# Patient Record
Sex: FEMALE | URBAN - METROPOLITAN AREA
[De-identification: names, ages, dates, MRNs, and addresses within clinical notes are randomized per-mention and may not be internally consistent; named-entity substitution may affect disease eponyms.]

---

## 2022-07-12 ENCOUNTER — OFFICE VISIT (OUTPATIENT)
Dept: URBAN - METROPOLITAN AREA CLINIC 12 | Facility: CLINIC | Age: 67
End: 2022-07-12

## 2022-08-12 ENCOUNTER — OFFICE VISIT (OUTPATIENT)
Dept: URBAN - METROPOLITAN AREA CLINIC 12 | Facility: CLINIC | Age: 67
End: 2022-08-12

## 2022-08-23 ENCOUNTER — DASHBOARD ENCOUNTERS (OUTPATIENT)
Age: 67
End: 2022-08-23

## 2022-08-23 ENCOUNTER — OFFICE VISIT (OUTPATIENT)
Dept: URBAN - METROPOLITAN AREA CLINIC 12 | Facility: CLINIC | Age: 67
End: 2022-08-23
Payer: MEDICARE

## 2022-08-23 VITALS
BODY MASS INDEX: 22.46 KG/M2 | TEMPERATURE: 97.8 F | HEART RATE: 70 BPM | WEIGHT: 114.4 LBS | HEIGHT: 60 IN | SYSTOLIC BLOOD PRESSURE: 137 MMHG | DIASTOLIC BLOOD PRESSURE: 77 MMHG

## 2022-08-23 DIAGNOSIS — Z12.11 COLON CANCER SCREENING: ICD-10-CM

## 2022-08-23 DIAGNOSIS — K58.1 IRRITABLE BOWEL SYNDROME WITH CONSTIPATION: ICD-10-CM

## 2022-08-23 PROCEDURE — 99204 OFFICE O/P NEW MOD 45 MIN: CPT | Performed by: INTERNAL MEDICINE

## 2022-08-23 RX ORDER — FLUTICASONE PROPIONATE 50 UG/1
1 APPLICATION SPRAY, METERED NASAL
Status: ACTIVE | COMMUNITY

## 2022-08-23 RX ORDER — ROSUVASTATIN CALCIUM 5 MG/1
1 TABLET TABLET, FILM COATED ORAL
Status: ACTIVE | COMMUNITY

## 2022-08-23 RX ORDER — MONTELUKAST 10 MG/1
1 TABLET TABLET, FILM COATED ORAL
Status: ACTIVE | COMMUNITY

## 2022-08-23 RX ORDER — ALENDRONATE SODIUM 70 MG/1
1 TABLET 30 MINUTES BEFORE THE FIRST FOOD, BEVERAGE OR MEDICINE OF THE DAY WITH PLAIN WATER TABLET ORAL
Status: ACTIVE | COMMUNITY

## 2022-08-23 RX ORDER — LEVOTHYROXINE SODIUM 50 UG/1
1 TABLET IN THE MORNING ON AN EMPTY STOMACH TABLET ORAL
Status: ACTIVE | COMMUNITY

## 2022-08-23 RX ORDER — METHYLPREDNISOLONE 4 MG/1
1 TABLET IN THE MORNING WITH FOOD OR MILK TABLET ORAL
Status: ACTIVE | COMMUNITY

## 2022-08-23 RX ORDER — AMOXICILLIN AND CLAVULANATE POTASSIUM 875; 125 MG/1; MG/1
1 TABLET TABLET, FILM COATED ORAL
Status: ACTIVE | COMMUNITY

## 2022-08-23 NOTE — HPI-TODAY'S VISIT:
67F here wth IBS last colon 2013 c/o IBS x 10 yrs c/o bloating and constipation. symptoms got better once she retired stress makes it worse. but otherwise she can manage it with OTC laxatives prn

## 2022-09-08 PROBLEM — 440630006: Status: ACTIVE | Noted: 2022-09-08

## 2022-09-15 ENCOUNTER — TELEPHONE ENCOUNTER (OUTPATIENT)
Dept: URBAN - METROPOLITAN AREA CLINIC 12 | Facility: CLINIC | Age: 67
End: 2022-09-15

## 2022-09-20 ENCOUNTER — TELEPHONE ENCOUNTER (OUTPATIENT)
Dept: URBAN - METROPOLITAN AREA CLINIC 23 | Facility: CLINIC | Age: 67
End: 2022-09-20

## 2022-09-21 ENCOUNTER — TELEPHONE ENCOUNTER (OUTPATIENT)
Dept: URBAN - METROPOLITAN AREA CLINIC 96 | Facility: CLINIC | Age: 67
End: 2022-09-21

## 2022-09-22 ENCOUNTER — TELEPHONE ENCOUNTER (OUTPATIENT)
Dept: URBAN - METROPOLITAN AREA CLINIC 96 | Facility: CLINIC | Age: 67
End: 2022-09-22

## 2022-10-05 ENCOUNTER — OFFICE VISIT (OUTPATIENT)
Dept: URBAN - METROPOLITAN AREA SURGERY CENTER 15 | Facility: SURGERY CENTER | Age: 67
End: 2022-10-05
Payer: MEDICARE

## 2022-10-05 ENCOUNTER — CLAIMS CREATED FROM THE CLAIM WINDOW (OUTPATIENT)
Dept: URBAN - METROPOLITAN AREA CLINIC 4 | Facility: CLINIC | Age: 67
End: 2022-10-05
Payer: MEDICARE

## 2022-10-05 DIAGNOSIS — Z12.11 COLON CANCER SCREENING: ICD-10-CM

## 2022-10-05 DIAGNOSIS — D12.3 ADENOMA OF TRANSVERSE COLON: ICD-10-CM

## 2022-10-05 DIAGNOSIS — D12.3 BENIGN NEOPLASM OF TRANSVERSE COLON: ICD-10-CM

## 2022-10-05 PROCEDURE — 88305 TISSUE EXAM BY PATHOLOGIST: CPT | Performed by: PATHOLOGY

## 2022-10-05 PROCEDURE — 45380 COLONOSCOPY AND BIOPSY: CPT | Performed by: INTERNAL MEDICINE

## 2022-10-05 PROCEDURE — G8907 PT DOC NO EVENTS ON DISCHARG: HCPCS | Performed by: INTERNAL MEDICINE

## 2022-10-05 RX ORDER — ALENDRONATE SODIUM 70 MG/1
1 TABLET 30 MINUTES BEFORE THE FIRST FOOD, BEVERAGE OR MEDICINE OF THE DAY WITH PLAIN WATER TABLET ORAL
Status: ACTIVE | COMMUNITY

## 2022-10-05 RX ORDER — METHYLPREDNISOLONE 4 MG/1
1 TABLET IN THE MORNING WITH FOOD OR MILK TABLET ORAL
Status: ACTIVE | COMMUNITY

## 2022-10-05 RX ORDER — MONTELUKAST 10 MG/1
1 TABLET TABLET, FILM COATED ORAL
Status: ACTIVE | COMMUNITY

## 2022-10-05 RX ORDER — LEVOTHYROXINE SODIUM 50 UG/1
1 TABLET IN THE MORNING ON AN EMPTY STOMACH TABLET ORAL
Status: ACTIVE | COMMUNITY

## 2022-10-05 RX ORDER — AMOXICILLIN AND CLAVULANATE POTASSIUM 875; 125 MG/1; MG/1
1 TABLET TABLET, FILM COATED ORAL
Status: ACTIVE | COMMUNITY

## 2022-10-05 RX ORDER — ROSUVASTATIN CALCIUM 5 MG/1
1 TABLET TABLET, FILM COATED ORAL
Status: ACTIVE | COMMUNITY

## 2022-10-05 RX ORDER — FLUTICASONE PROPIONATE 50 UG/1
1 APPLICATION SPRAY, METERED NASAL
Status: ACTIVE | COMMUNITY

## 2024-01-25 ENCOUNTER — OFFICE VISIT (OUTPATIENT)
Dept: URBAN - METROPOLITAN AREA CLINIC 23 | Facility: CLINIC | Age: 69
End: 2024-01-25

## 2024-02-27 ENCOUNTER — OV EP (OUTPATIENT)
Dept: URBAN - METROPOLITAN AREA CLINIC 23 | Facility: CLINIC | Age: 69
End: 2024-02-27

## 2024-06-27 ENCOUNTER — OFFICE VISIT (OUTPATIENT)
Dept: URBAN - METROPOLITAN AREA CLINIC 111 | Facility: CLINIC | Age: 69
End: 2024-06-27

## 2024-07-02 ENCOUNTER — OFFICE VISIT (OUTPATIENT)
Dept: URBAN - METROPOLITAN AREA CLINIC 111 | Facility: CLINIC | Age: 69
End: 2024-07-02
Payer: MEDICARE

## 2024-07-02 VITALS
WEIGHT: 108 LBS | DIASTOLIC BLOOD PRESSURE: 76 MMHG | SYSTOLIC BLOOD PRESSURE: 127 MMHG | HEIGHT: 60 IN | HEART RATE: 57 BPM | TEMPERATURE: 98.15 F | BODY MASS INDEX: 21.2 KG/M2

## 2024-07-02 DIAGNOSIS — K58.1 IRRITABLE BOWEL SYNDROME WITH CONSTIPATION: ICD-10-CM

## 2024-07-02 DIAGNOSIS — Z86.010 HISTORY OF ADENOMATOUS POLYP OF COLON: ICD-10-CM

## 2024-07-02 PROCEDURE — 99213 OFFICE O/P EST LOW 20 MIN: CPT | Performed by: PHYSICIAN ASSISTANT

## 2024-07-02 RX ORDER — LEVOTHYROXINE SODIUM 50 UG/1
1 TABLET IN THE MORNING ON AN EMPTY STOMACH TABLET ORAL
Status: ACTIVE | COMMUNITY

## 2024-07-02 RX ORDER — AMOXICILLIN AND CLAVULANATE POTASSIUM 875; 125 MG/1; MG/1
1 TABLET TABLET, FILM COATED ORAL
Status: ON HOLD | COMMUNITY

## 2024-07-02 RX ORDER — ROSUVASTATIN 5 MG/1
1 TABLET TABLET, FILM COATED ORAL
Status: ACTIVE | COMMUNITY

## 2024-07-02 RX ORDER — FLUTICASONE PROPIONATE 50 UG/1
1 APPLICATION SPRAY, METERED NASAL
Status: ON HOLD | COMMUNITY

## 2024-07-02 RX ORDER — MONTELUKAST 10 MG/1
1 TABLET TABLET, FILM COATED ORAL
Status: ACTIVE | COMMUNITY

## 2024-07-02 RX ORDER — METHYLPREDNISOLONE 4 MG/1
1 TABLET IN THE MORNING WITH FOOD OR MILK TABLET ORAL
Status: ON HOLD | COMMUNITY

## 2024-07-02 RX ORDER — ALENDRONATE SODIUM 70 MG/1
1 TABLET 30 MINUTES BEFORE THE FIRST FOOD, BEVERAGE OR MEDICINE OF THE DAY WITH PLAIN WATER TABLET ORAL
Status: ACTIVE | COMMUNITY

## 2024-07-02 NOTE — HPI-TODAY'S VISIT:
68 y/o female here to follow up. Seen in 8/2022 by Dr. Chen for IBS-C and colon cancer screening. Pt was managing with OTC laxatives. S/p colonoscopy in 10/2022 by Dr. Zafar revealing 2 polyps and non-bleeding internal hemorrhoids. Path revealing tubular adenomas and repeat colon was recommended in 5 years.  Long h/o IBS which got really bad in 2014. She reports it worsens with stress. She tries to manage with diet. She eats very healthy. She also reports she had a stressful life when younger. She reports a lot of antibiotic use for sinuses. She has a stool about 2 times a day now. Constipation has been better. Not taking any medicines. She feels emptied out. No rectal bleeding. She sometimes has bloating and abdominal discomfort. She will take gas-x if needed which is not often. She tries to follow low fodmap diet. She had acid reflux in the past and took Omeprazole. No issues now. She reports her tongue is discolored recently. Denies a bad taste in her mouth. No N/V. She reports having some chest discomfort which started recently. She is seeing PCP next week.

## 2024-08-15 ENCOUNTER — LAB OUTSIDE AN ENCOUNTER (OUTPATIENT)
Dept: URBAN - METROPOLITAN AREA CLINIC 111 | Facility: CLINIC | Age: 69
End: 2024-08-15

## 2024-08-15 ENCOUNTER — OFFICE VISIT (OUTPATIENT)
Dept: URBAN - METROPOLITAN AREA CLINIC 111 | Facility: CLINIC | Age: 69
End: 2024-08-15
Payer: MEDICARE

## 2024-08-15 VITALS
HEIGHT: 60 IN | HEART RATE: 71 BPM | DIASTOLIC BLOOD PRESSURE: 75 MMHG | WEIGHT: 110 LBS | TEMPERATURE: 98.47 F | BODY MASS INDEX: 21.6 KG/M2 | SYSTOLIC BLOOD PRESSURE: 122 MMHG

## 2024-08-15 DIAGNOSIS — R68.89 THROAT CLEARING: ICD-10-CM

## 2024-08-15 DIAGNOSIS — R09.3 PHLEGM IN THROAT: ICD-10-CM

## 2024-08-15 DIAGNOSIS — Z87.19 HISTORY OF INDIGESTION: ICD-10-CM

## 2024-08-15 DIAGNOSIS — Z86.010 HISTORY OF ADENOMATOUS POLYP OF COLON: ICD-10-CM

## 2024-08-15 PROCEDURE — 99214 OFFICE O/P EST MOD 30 MIN: CPT | Performed by: PHYSICIAN ASSISTANT

## 2024-08-15 RX ORDER — AMOXICILLIN AND CLAVULANATE POTASSIUM 875; 125 MG/1; MG/1
1 TABLET TABLET, FILM COATED ORAL
Status: ON HOLD | COMMUNITY

## 2024-08-15 RX ORDER — ALENDRONATE SODIUM 70 MG/1
1 TABLET 30 MINUTES BEFORE THE FIRST FOOD, BEVERAGE OR MEDICINE OF THE DAY WITH PLAIN WATER TABLET ORAL
Status: ACTIVE | COMMUNITY

## 2024-08-15 RX ORDER — METHYLPREDNISOLONE 4 MG/1
1 TABLET IN THE MORNING WITH FOOD OR MILK TABLET ORAL
Status: ON HOLD | COMMUNITY

## 2024-08-15 RX ORDER — MONTELUKAST 10 MG/1
1 TABLET TABLET, FILM COATED ORAL
Status: ACTIVE | COMMUNITY

## 2024-08-15 RX ORDER — FLUTICASONE PROPIONATE 50 UG/1
1 APPLICATION SPRAY, METERED NASAL
Status: ON HOLD | COMMUNITY

## 2024-08-15 RX ORDER — ROSUVASTATIN 5 MG/1
1 TABLET TABLET, FILM COATED ORAL
Status: ACTIVE | COMMUNITY

## 2024-08-15 RX ORDER — LEVOTHYROXINE SODIUM 50 UG/1
1 TABLET IN THE MORNING ON AN EMPTY STOMACH TABLET ORAL
Status: ACTIVE | COMMUNITY

## 2024-08-15 NOTE — HPI-TODAY'S VISIT:
70 y/o female here to discuss doing an EGD. Seen by me on 7/2 for IBS-C and h/o adenomatous polyp. She was doing well regarding IBS which is mostly managed with diet. She will be due to repeat colon in 2027. She was told to see PCP for recent chest discomfort. She denied any upper GI symptoms at the time.  She saw cardiology about 2 weeks ago and states her heart is fine. She is no longer having any chest discomfort. She is going to do a stress test. She also just saw allergist. She has issues with phlegm and voice changing. She is spitting up mucus. No cough. H/o a lot of sinus problems. She has to clear her throat a lot. She was told by allergist her symptoms could be d/t reflux. No previous EGD. She was prescribed Pantoprazole and Famotidine but she has not started them yet. Her son elevated her bed. She denies feeling heartburn. She had some kind of URI recently too as did her family. She c/o having a headache at times too.    Previous: S/p colonoscopy in 10/2022 by Dr. Zafar revealing 2 polyps and non-bleeding internal hemorrhoids. Path revealing tubular adenomas and repeat colon was recommended in 5 years. Long h/o IBS which got really bad in 2014. She reports it worsens with stress. She tries to manage with diet. She eats very healthy. She has a stool about 2 times a day now. Constipation has been better. Not taking any medicines. She feels emptied out. No rectal bleeding. She sometimes has bloating and abdominal discomfort. She will take gas-x if needed which is not often. She tries to follow low fodmap diet. She had acid reflux in the past and took Omeprazole. No issues now.

## 2024-09-03 ENCOUNTER — OFFICE VISIT (OUTPATIENT)
Dept: URBAN - METROPOLITAN AREA CLINIC 12 | Facility: CLINIC | Age: 69
End: 2024-09-03

## 2024-10-07 ENCOUNTER — TELEPHONE ENCOUNTER (OUTPATIENT)
Dept: URBAN - METROPOLITAN AREA CLINIC 111 | Facility: CLINIC | Age: 69
End: 2024-10-07

## 2024-10-08 ENCOUNTER — WEB ENCOUNTER (OUTPATIENT)
Dept: URBAN - METROPOLITAN AREA CLINIC 78 | Facility: CLINIC | Age: 69
End: 2024-10-08

## 2024-10-11 ENCOUNTER — TELEPHONE ENCOUNTER (OUTPATIENT)
Dept: URBAN - METROPOLITAN AREA CLINIC 111 | Facility: CLINIC | Age: 69
End: 2024-10-11

## 2024-11-19 ENCOUNTER — OFFICE VISIT (OUTPATIENT)
Dept: URBAN - METROPOLITAN AREA CLINIC 12 | Facility: CLINIC | Age: 69
End: 2024-11-19

## 2024-11-21 ENCOUNTER — OFFICE VISIT (OUTPATIENT)
Dept: URBAN - METROPOLITAN AREA CLINIC 111 | Facility: CLINIC | Age: 69
End: 2024-11-21

## 2024-12-04 ENCOUNTER — OFFICE VISIT (OUTPATIENT)
Dept: URBAN - METROPOLITAN AREA CLINIC 111 | Facility: CLINIC | Age: 69
End: 2024-12-04

## 2024-12-06 ENCOUNTER — WEB ENCOUNTER (OUTPATIENT)
Dept: URBAN - METROPOLITAN AREA CLINIC 78 | Facility: CLINIC | Age: 69
End: 2024-12-06

## 2024-12-10 ENCOUNTER — OFFICE VISIT (OUTPATIENT)
Dept: URBAN - METROPOLITAN AREA SURGERY CENTER 8 | Facility: SURGERY CENTER | Age: 69
End: 2024-12-10
Payer: MEDICARE

## 2024-12-10 ENCOUNTER — OFFICE VISIT (OUTPATIENT)
Dept: URBAN - METROPOLITAN AREA SURGERY CENTER 8 | Facility: SURGERY CENTER | Age: 69
End: 2024-12-10

## 2024-12-10 ENCOUNTER — CLAIMS CREATED FROM THE CLAIM WINDOW (OUTPATIENT)
Dept: URBAN - METROPOLITAN AREA CLINIC 4 | Facility: CLINIC | Age: 69
End: 2024-12-10
Payer: MEDICARE

## 2024-12-10 ENCOUNTER — TELEPHONE ENCOUNTER (OUTPATIENT)
Dept: URBAN - METROPOLITAN AREA CLINIC 12 | Facility: CLINIC | Age: 69
End: 2024-12-10

## 2024-12-10 DIAGNOSIS — K44.9 HIATAL HERNIA: ICD-10-CM

## 2024-12-10 DIAGNOSIS — K31.89 OTHER DISEASES OF STOMACH AND DUODENUM: ICD-10-CM

## 2024-12-10 DIAGNOSIS — R12 HEARTBURN: ICD-10-CM

## 2024-12-10 DIAGNOSIS — K31.7 POLYP OF STOMACH AND DUODENUM: ICD-10-CM

## 2024-12-10 DIAGNOSIS — K31.7 GASTRIC POLYP: ICD-10-CM

## 2024-12-10 PROCEDURE — 43254 EGD ENDO MUCOSAL RESECTION: CPT | Performed by: CLINIC/CENTER

## 2024-12-10 PROCEDURE — 43239 EGD BIOPSY SINGLE/MULTIPLE: CPT | Performed by: CLINIC/CENTER

## 2024-12-10 PROCEDURE — 43254 EGD ENDO MUCOSAL RESECTION: CPT | Performed by: INTERNAL MEDICINE

## 2024-12-10 PROCEDURE — 88305 TISSUE EXAM BY PATHOLOGIST: CPT | Performed by: PATHOLOGY

## 2024-12-10 PROCEDURE — 00731 ANES UPR GI NDSC PX NOS: CPT | Performed by: REGISTERED NURSE

## 2024-12-10 PROCEDURE — 88312 SPECIAL STAINS GROUP 1: CPT | Performed by: PATHOLOGY

## 2024-12-10 PROCEDURE — 43239 EGD BIOPSY SINGLE/MULTIPLE: CPT | Performed by: INTERNAL MEDICINE

## 2024-12-10 RX ORDER — ROSUVASTATIN 5 MG/1
1 TABLET TABLET, FILM COATED ORAL
Status: ACTIVE | COMMUNITY

## 2024-12-10 RX ORDER — METHYLPREDNISOLONE 4 MG/1
1 TABLET IN THE MORNING WITH FOOD OR MILK TABLET ORAL
Status: ON HOLD | COMMUNITY

## 2024-12-10 RX ORDER — LEVOTHYROXINE SODIUM 50 UG/1
1 TABLET IN THE MORNING ON AN EMPTY STOMACH TABLET ORAL
Status: ACTIVE | COMMUNITY

## 2024-12-10 RX ORDER — AMOXICILLIN AND CLAVULANATE POTASSIUM 875; 125 MG/1; MG/1
1 TABLET TABLET, FILM COATED ORAL
Status: ON HOLD | COMMUNITY

## 2024-12-10 RX ORDER — ALENDRONATE SODIUM 70 MG/1
1 TABLET 30 MINUTES BEFORE THE FIRST FOOD, BEVERAGE OR MEDICINE OF THE DAY WITH PLAIN WATER TABLET ORAL
Status: ACTIVE | COMMUNITY

## 2024-12-10 RX ORDER — MONTELUKAST 10 MG/1
1 TABLET TABLET, FILM COATED ORAL
Status: ACTIVE | COMMUNITY

## 2024-12-10 RX ORDER — FLUTICASONE PROPIONATE 50 UG/1
1 APPLICATION SPRAY, METERED NASAL
Status: ON HOLD | COMMUNITY

## 2024-12-23 ENCOUNTER — OFFICE VISIT (OUTPATIENT)
Dept: URBAN - METROPOLITAN AREA CLINIC 111 | Facility: CLINIC | Age: 69
End: 2024-12-23
Payer: MEDICARE

## 2024-12-23 VITALS
WEIGHT: 115 LBS | HEIGHT: 60 IN | BODY MASS INDEX: 22.58 KG/M2 | DIASTOLIC BLOOD PRESSURE: 83 MMHG | TEMPERATURE: 98.1 F | HEART RATE: 68 BPM | SYSTOLIC BLOOD PRESSURE: 143 MMHG

## 2024-12-23 DIAGNOSIS — K30 INDIGESTION: ICD-10-CM

## 2024-12-23 DIAGNOSIS — K44.9 HIATAL HERNIA: ICD-10-CM

## 2024-12-23 DIAGNOSIS — Z86.0101 HISTORY OF ADENOMATOUS POLYP OF COLON: ICD-10-CM

## 2024-12-23 PROCEDURE — 99213 OFFICE O/P EST LOW 20 MIN: CPT | Performed by: PHYSICIAN ASSISTANT

## 2024-12-23 RX ORDER — FLUTICASONE PROPIONATE 50 UG/1
1 APPLICATION SPRAY, METERED NASAL
Status: ON HOLD | COMMUNITY

## 2024-12-23 RX ORDER — ROSUVASTATIN 5 MG/1
1 TABLET TABLET, FILM COATED ORAL
Status: ACTIVE | COMMUNITY

## 2024-12-23 RX ORDER — LEVOTHYROXINE SODIUM 50 UG/1
1 TABLET IN THE MORNING ON AN EMPTY STOMACH TABLET ORAL
Status: ACTIVE | COMMUNITY

## 2024-12-23 RX ORDER — ALENDRONATE SODIUM 70 MG/1
1 TABLET 30 MINUTES BEFORE THE FIRST FOOD, BEVERAGE OR MEDICINE OF THE DAY WITH PLAIN WATER TABLET ORAL
Status: ACTIVE | COMMUNITY

## 2024-12-23 RX ORDER — METHYLPREDNISOLONE 4 MG/1
1 TABLET IN THE MORNING WITH FOOD OR MILK TABLET ORAL
Status: ON HOLD | COMMUNITY

## 2024-12-23 RX ORDER — AMOXICILLIN AND CLAVULANATE POTASSIUM 875; 125 MG/1; MG/1
1 TABLET TABLET, FILM COATED ORAL
Status: ON HOLD | COMMUNITY

## 2024-12-23 RX ORDER — MONTELUKAST 10 MG/1
1 TABLET TABLET, FILM COATED ORAL
Status: ACTIVE | COMMUNITY

## 2024-12-23 NOTE — HPI-TODAY'S VISIT:
70 y/o female here to follow up after EGD. Seen by me on 8/15 for throat clearing, h/o indigestion, h/o adenomatous colon polyp, phlegm in throat, and voice hoarseness. EGD was ordered after cardiac clearance. Pt had chest discomfort for which she saw cardiology. She was given PPI & Pepcid by allergist but had not started them yet. S/p EGD on 12/10 by Dr. Zafar revealing a single gastric polyp that was resected, small hiatal hernia, and gastritis. Path revealing gastropathy.    Pt is doing okay. She has been taking Omeprazole in the morning which has helped. She thinks it is 40 mg. She has been on it for a few weeks.   Previous: S/p colonoscopy in 10/2022 by Dr. Zafar revealing 2 polyps and non-bleeding internal hemorrhoids. Path revealing tubular adenomas and repeat colon was recommended in 5 years. Long h/o IBS which got really bad in 2014. She reports it worsens with stress. She tries to manage with diet. She eats very healthy. She has a stool about 2 times a day now. Constipation has been better. Not taking any medicines. She feels emptied out. No rectal bleeding. She sometimes has bloating and abdominal discomfort. She will take gas-x if needed which is not often. She tries to follow low fodmap diet. She had acid reflux in the past and took Omeprazole.